# Patient Record
Sex: FEMALE | Race: WHITE | NOT HISPANIC OR LATINO | Employment: FULL TIME
[De-identification: names, ages, dates, MRNs, and addresses within clinical notes are randomized per-mention and may not be internally consistent; named-entity substitution may affect disease eponyms.]

---

## 2017-07-15 ENCOUNTER — HEALTH MAINTENANCE LETTER (OUTPATIENT)
Age: 37
End: 2017-07-15

## 2018-03-22 ENCOUNTER — APPOINTMENT (OUTPATIENT)
Dept: ULTRASOUND IMAGING | Facility: CLINIC | Age: 38
End: 2018-03-22
Attending: OBSTETRICS & GYNECOLOGY
Payer: COMMERCIAL

## 2018-03-22 ENCOUNTER — HOSPITAL ENCOUNTER (OUTPATIENT)
Facility: CLINIC | Age: 38
Discharge: HOME | End: 2018-03-23
Attending: OBSTETRICS & GYNECOLOGY | Admitting: OBSTETRICS & GYNECOLOGY
Payer: COMMERCIAL

## 2018-03-22 VITALS — BODY MASS INDEX: 33.92 KG/M2 | WEIGHT: 229 LBS | HEIGHT: 69 IN | TEMPERATURE: 98.5 F

## 2018-03-22 PROBLEM — Z36.89 ENCOUNTER FOR TRIAGE IN PREGNANT PATIENT: Status: ACTIVE | Noted: 2018-03-22

## 2018-03-22 PROCEDURE — 76815 OB US LIMITED FETUS(S): CPT

## 2018-03-22 NOTE — IP AVS SNAPSHOT
[facility] Labor and Delivery    [address]    [facility] [address]    Phone:  [phone]    Fax:  [phone]                                       After Visit Summary   3/22/2018    Eliza Park    MRN: [id number]           After Visit Summary Signature Page     I have received my discharge instructions, and my questions have been answered. I have discussed any challenges I see with this plan with the nurse or doctor.    ..........................................................................................................................................  Patient/Patient Representative Signature      ..........................................................................................................................................  Patient Representative Print Name and Relationship to Patient    ..................................................               ................................................  Date                                            Time    ..........................................................................................................................................  Reviewed by Signature/Title    ...................................................              ..............................................  Date                                                            Time

## 2018-03-22 NOTE — IP AVS SNAPSHOT
MRN:[id number]                      After Visit Summary   3/22/2018    Eliza Park    MRN: [id number]           Thank you!     Thank you for choosing [facility] for your care. Our goal is always to provide you with excellent care. Hearing back from our patients is one way we can continue to improve our services. Please take a few minutes to complete the written survey that you may receive in the mail after you visit. If you would like to speak to someone directly about your visit please contact Patient Relations at [phone]. Thank you!          Patient Information     Date Of Birth          1980        About your hospital stay     You were admitted on:  March 22, 2018 You last received care in the:  [facility] Delivery    You were discharged on:  March 23, 2018       Who to Call     For medical emergencies, please call 911.  For non-urgent questions about your medical care, please call your primary care provider or clinic, None          Attending Provider     Provider Specialty    Ese Tovar MD OB/Gyn       Primary Care Provider    None Specified      Further instructions from your care team       Discharge Instruction for Undelivered Patients      You were seen for: Bleeding Assessment  We Consulted: Dr. Tovar  You had (Test or Medicine):U/S, Cervical dilation exam, uterine and fetal monitoring     Diet:   Drink 8 to 12 glasses of liquids (milk, juice, water) every day.  You may eat meals and snacks.     Activity:  Rest the pelvic area. No sex. Do not stimulate breasts or nipples.  Count fetal kicks everyday (see handout)  Call your doctor or nurse midwife if your baby is moving less than usual.     Call your provider if you notice:  Swelling in your face or increased swelling in your hands or legs.  Headaches that are not relieved by Tylenol (acetaminophen).  Changes in your vision (blurring: seeing spots or stars.)  Nausea (sick to your  "stomach) and vomiting (throwing up).   Weight gain of 5 pounds or more per week.  Heartburn that doesn't go away.  Signs of bladder infection: pain when you urinate (use the toilet), need to go more often and more urgently.  The bag of negron (rupture of membranes) breaks, or you notice leaking in your underwear.  Bright red blood in your underwear.  Abdominal (lower belly) or stomach pain.  For first baby: Contractions (tightening) less than 5 minutes apart for one hour or more.  Second (plus) baby: Contractions (tightening) less than 10 minutes apart and getting stronger.  *If less than 34 weeks: Contractions (tightenings) more than 6 times in one hour.  Increase or change in vaginal discharge (note the color and amount)      Follow-up:  As scheduled in the clinic          Pending Results     Date and Time Order Name Status Description    3/22/2018 2202 US OB Limited One Or More Fetuses Preliminary             Admission Information     Date & Time Provider Department Dept. Phone    3/22/2018 Ese Tovar MD [facility] Labor and Delivery [phone]      Your Vitals Were     Temperature Height Weight BMI (Body Mass Index)          98.5  F (36.9  C) 1.753 m (5' 9\") 103.9 kg (229 lb) 33.82 kg/m2        MyChart Information     [employer] lets you send messages to your doctor, view your test results, renew your prescriptions, schedule appointments and more. To sign up, go to [email or web address] . Click on \"Log in\" on the left side of the screen, which will take you to the Welcome page. Then click on \"Sign up Now\" on the right side of the page.     You will be asked to enter the access code listed below, as well as some personal information. Please follow the directions to create your username and password.     Your access code is: [email or web address]  Expires: 2018 12:44 AM     Your access code will  in 90 days. If you need help or a new code, please call your [place] clinic or [phone].        Care " EveryWhere ID     This is your Care EveryWhere ID. This could be used by other organizations to access your [employer] medical records  [id number]        Equal Access to Services     JENIFER STALEY : Eduardo Benitez, ruth guzmán, donta kahallie edwards, khurram rockwell radharaegan fulton laLuistiesha rodney. So [facility] [phone].    ATENCIÓN: Si habla español, tiene a tang disposición servicios gratuitos de asistencia lingüística. Llame al [phone].    We comply with applicable federal civil rights laws and [place] laws. We do not discriminate on the basis of race, color, national origin, age, disability, sex, sexual orientation, or gender identity.               Review of your medicines      UNREVIEWED medicines. Ask your doctor about these medicines        Dose / Directions    PNV PO        Dose:  1 tablet   Take 1 tablet by mouth   Refills:  0       VITAMIN D (CHOLECALCIFEROL) PO        Dose:  1000 Units   Take 1,000 Units by mouth daily   Refills:  0                Protect others around you: Learn how to safely use, store and throw away your medicines at www.disposemymeds.org.             Medication List: This is a list of all your medications and when to take them. Check marks below indicate your daily home schedule. Keep this list as a reference.      Medications           Morning Afternoon Evening Bedtime As Needed    PNV PO   Take 1 tablet by mouth                                VITAMIN D (CHOLECALCIFEROL) PO   Take 1,000 Units by mouth daily                                          More Information        Kick Counts  It s normal to worry about your baby s health. One way you can know your baby s doing well is to record the baby s movements once a day. This is called a kick count. You will usually feel your baby move by the 20th week of pregnancy. Remember to take your kick count records to all your appointments with your healthcare provider.       How to Count Kicks    Choose a time when the baby  is active, such as after a meal.     Sit comfortably or lie on your side.     The first time the baby moves, write down the time.     Count each movement until the baby has moved 10 times. This can take from 20 minutes to 2 hours.     If the baby hasn t moved 4 times in 1 hour, pat your stomach to wake the baby up.    Write down the time you feel the baby s 10th movement.    Try to do it at the same time each day.  When to Call Your Healthcare Provider  Call your healthcare provider right away if you notice any of the following:    Your baby moves fewer than 10 times in 2 hours while you re doing kick counts.    Your baby moves much less often than on the days before.    You have not felt your baby move all day.    [id number] [employer]. [address] [place] [address], [place] [address]. All rights reserved. This information is not intended as a substitute for professional medical care. Always follow your healthcare professional's instructions.  This information has been modified by your health care provider with permission from the publisher.

## 2018-03-23 LAB
ALBUMIN UR-MCNC: NEGATIVE MG/DL
APPEARANCE UR: CLEAR
BILIRUB UR QL STRIP: NEGATIVE
COLOR UR AUTO: NORMAL
FIBRONECTIN FETAL VAG QL: NEGATIVE
GLUCOSE UR STRIP-MCNC: NEGATIVE MG/DL
HGB UR QL STRIP: NEGATIVE
KETONES UR STRIP-MCNC: NEGATIVE MG/DL
LEUKOCYTE ESTERASE UR QL STRIP: NEGATIVE
NITRATE UR QL: NEGATIVE
PH UR STRIP: 7 PH (ref 5–7)
RBC #/AREA URNS AUTO: 0 /HPF (ref 0–2)
SOURCE: NORMAL
SP GR UR STRIP: 1 (ref 1–1.03)
UROBILINOGEN UR STRIP-MCNC: 0 MG/DL (ref 0–2)
WBC #/AREA URNS AUTO: 1 /HPF (ref 0–5)

## 2018-03-23 PROCEDURE — G0463 HOSPITAL OUTPT CLINIC VISIT: HCPCS

## 2018-03-23 PROCEDURE — 82731 ASSAY OF FETAL FIBRONECTIN: CPT | Performed by: OBSTETRICS & GYNECOLOGY

## 2018-03-23 PROCEDURE — 81001 URINALYSIS AUTO W/SCOPE: CPT | Performed by: OBSTETRICS & GYNECOLOGY

## 2018-03-23 NOTE — PLAN OF CARE
Data: Patient presented to [facility]: 3/22/2018  8:30 PM.  Reason for maternal/fetal assessment is vaginal bleeding. Patient reports lower abdominal cramping for the past few days and had bright red bleeding when she wiped after using the restroom today.  Patient is a .  No prenatal record found as this patient does not go to a clinic affiliated with [facility]. Attempting to locate a prenatal.  Pregnancy  has been complicated by has been uncomplicated.  Gestational Age 26w2d. VSS. Fetal movement present. Patient denies leaking of vaginal fluid/rupture of membranes, abdominal pain, pelvic pressure, nausea, vomiting, headache, visual disturbances, epigastric or URQ pain, significant edema. Support person is not present.   Action: Verbal consent for EFM. Triage assessment completed. Bill of rights reviewed.  Response: Patient verbalized agreement with plan. Will contact Dr Ese Tovar with update and further orders.

## 2018-03-23 NOTE — DISCHARGE INSTRUCTIONS
Discharge Instruction for Undelivered Patients      You were seen for: Bleeding Assessment  We Consulted: Dr. Tovar  You had (Test or Medicine):U/S, Cervical dilation exam, uterine and fetal monitoring     Diet:   Drink 8 to 12 glasses of liquids (milk, juice, water) every day.  You may eat meals and snacks.     Activity:  Rest the pelvic area. No sex. Do not stimulate breasts or nipples.  Count fetal kicks everyday (see handout)  Call your doctor or nurse midwife if your baby is moving less than usual.     Call your provider if you notice:  Swelling in your face or increased swelling in your hands or legs.  Headaches that are not relieved by Tylenol (acetaminophen).  Changes in your vision (blurring: seeing spots or stars.)  Nausea (sick to your stomach) and vomiting (throwing up).   Weight gain of 5 pounds or more per week.  Heartburn that doesn't go away.  Signs of bladder infection: pain when you urinate (use the toilet), need to go more often and more urgently.  The bag of negron (rupture of membranes) breaks, or you notice leaking in your underwear.  Bright red blood in your underwear.  Abdominal (lower belly) or stomach pain.  For first baby: Contractions (tightening) less than 5 minutes apart for one hour or more.  Second (plus) baby: Contractions (tightening) less than 10 minutes apart and getting stronger.  *If less than 34 weeks: Contractions (tightenings) more than 6 times in one hour.  Increase or change in vaginal discharge (note the color and amount)      Follow-up:  As scheduled in the clinic

## 2018-03-23 NOTE — PROVIDER NOTIFICATION
03/22/18 2128   Provider Notification   Provider Name/Title Dr Tovar   Method of Notification Phone   Request Evaluate - Remote   Notification Reason Patient Arrived;Status Update     Dr Tovar updated: arrival, c/o of moderately painful, infrequent cherelle-vora contractions and vaginal bleeding with small clots when she wiped today at work. No active bleeding and no uterine activity noted now. MD states to order an OB limited ultrasound to determine placental location if we cannot obtain prenatal records. If no previa, check cervix and call back with results.

## 2018-03-23 NOTE — PROGRESS NOTES
Data: Patient assessed in the [facility] for vaginal bleeding.  Cervical exam posterior and closed.  Membranes intact.  Contractions not present.  Action:  Presumed adequate fetal oxygenation documented (see flow record). Discharge instructions reviewed.  Patient instructed to report change in fetal movement, vaginal leaking of fluid or bleeding, abdominal pain, or any concerns related to the pregnancy to her nurse/physician.    Response: Orders to discharge home per Ese Tovar.  Patient verbalized understanding of education and verbalized agreement with plan. Discharged to home at 0155  .

## 2018-03-23 NOTE — PROVIDER NOTIFICATION
03/23/18 0024   Provider Notification   Provider Name/Title Dr. Tovar   Method of Notification Phone   Request Evaluate - Remote   Notification Reason Other (Comment)   Updated MD of u/s results, pt report of bright red bleeding when she voided at 2300 which she noted when she wiped and some dripping into toilet (sample was not saved for RN to visualize), external visualization negative for any bleeding, abdomen soft and nontender to palpation. Orders received to collect FFN, check SVE. If cervix closed no need to send FFN. If closed/no bleeding ok to discharge pt to home.

## 2018-03-23 NOTE — PROVIDER NOTIFICATION
03/22/18 7570   OB Patient Position   Maternal Position Left lateral     Pt uncomfortable on triage bed. Repositioned to left lateral, unable to trace FHR with Eliza in lateral position.

## 2018-03-23 NOTE — PROGRESS NOTES
Cervix closed, thick, and posterior. No blood noted during exam. FFN not sent. Pt up to void and blood noted on toilet paper. UA sent.

## 2018-04-01 NOTE — PROGRESS NOTES
Results discussed directly with patient in clinic. Further details documented in the note.     Ese Tovar MD

## 2019-03-11 ENCOUNTER — HOSPITAL ENCOUNTER (EMERGENCY)
Facility: CLINIC | Age: 39
Discharge: HOME | End: 2019-03-11
Attending: PHYSICIAN ASSISTANT | Admitting: PHYSICIAN ASSISTANT
Payer: COMMERCIAL

## 2019-03-11 ENCOUNTER — APPOINTMENT (OUTPATIENT)
Dept: ULTRASOUND IMAGING | Facility: CLINIC | Age: 39
End: 2019-03-11
Attending: PHYSICIAN ASSISTANT
Payer: COMMERCIAL

## 2019-03-11 VITALS
BODY MASS INDEX: 31.96 KG/M2 | OXYGEN SATURATION: 99 % | DIASTOLIC BLOOD PRESSURE: 67 MMHG | HEART RATE: 91 BPM | WEIGHT: 218 LBS | SYSTOLIC BLOOD PRESSURE: 119 MMHG | RESPIRATION RATE: 18 BRPM | TEMPERATURE: 99.5 F

## 2019-03-11 DIAGNOSIS — N61.0 MASTITIS, RIGHT, ACUTE: ICD-10-CM

## 2019-03-11 PROCEDURE — 76642 ULTRASOUND BREAST LIMITED: CPT | Mod: RT

## 2019-03-11 PROCEDURE — 25000132 ZZH RX MED GY IP 250 OP 250 PS 637: Performed by: EMERGENCY MEDICINE

## 2019-03-11 PROCEDURE — 25000132 ZZH RX MED GY IP 250 OP 250 PS 637: Performed by: PHYSICIAN ASSISTANT

## 2019-03-11 PROCEDURE — 99284 EMERGENCY DEPT VISIT MOD MDM: CPT | Mod: 25

## 2019-03-11 RX ORDER — DICLOXACILLIN SODIUM 500 MG
500 CAPSULE ORAL 4 TIMES DAILY
Qty: 40 CAPSULE | Refills: 0 | Status: SHIPPED | OUTPATIENT
Start: 2019-03-11 | End: 2019-03-21

## 2019-03-11 RX ORDER — DICLOXACILLIN SODIUM 500 MG
500 CAPSULE ORAL ONCE
Status: COMPLETED | OUTPATIENT
Start: 2019-03-11 | End: 2019-03-11

## 2019-03-11 RX ORDER — IBUPROFEN 600 MG/1
600 TABLET, FILM COATED ORAL ONCE
Status: COMPLETED | OUTPATIENT
Start: 2019-03-11 | End: 2019-03-11

## 2019-03-11 RX ORDER — ACETAMINOPHEN 500 MG
1000 TABLET ORAL ONCE
Status: COMPLETED | OUTPATIENT
Start: 2019-03-11 | End: 2019-03-11

## 2019-03-11 RX ADMIN — ACETAMINOPHEN 1000 MG: 500 TABLET, FILM COATED ORAL at 19:20

## 2019-03-11 RX ADMIN — DICLOXACILLIN SODIUM 500 MG: 500 CAPSULE ORAL at 17:16

## 2019-03-11 RX ADMIN — IBUPROFEN 600 MG: 600 TABLET ORAL at 14:38

## 2019-03-11 ASSESSMENT — ENCOUNTER SYMPTOMS
MYALGIAS: 1
COLOR CHANGE: 1
FEVER: 1

## 2019-03-11 NOTE — ED PROVIDER NOTES
History     Chief Complaint:  Breast Pain     HPI   Eliza Park is a 38 year old female who is currently breastfeeding who presents for evaluation of breast pain. Yesterday, the patient started to develop redness, swelling, and pain to her right breast with a fever that has been as high as 103 at home and generalized myalgias. She has been taking Tylenol and Ibuprofen with limited improvement of her symptoms. She has not had any discharge from her breast. Due to this new pain and swelling of her right breast, the patient came into the ED with primary concern that she could have mastitis. Her OBGYN is Dr. Rubina Irby of [facility].     Allergies:  NKDA      Medications:    clonazePAM (KLONOPIN) 0.5 MG tablet  escitalopram (LEXAPRO) 10 MG tablet    Past Medical History:    Migraine headaches     Past Surgical History:    Endovenous RF, 1st vein     Family History:    Cerebrovascular disease - Father   Skin cancer - Mother     Social History:  Tobacco use:    Former smoker - 0.50 packs / day for 8 years  Alcohol use:    Negative    Marital status:    Single   Accompanied to ED by:  Alone      Review of Systems   Constitutional: Positive for fever.   Musculoskeletal: Positive for myalgias.        (+) Right breast pain    Skin: Positive for color change (redness, right breast).   All other systems reviewed and are negative.      Physical Exam   Vitals:  Patient Vitals for the past 24 hrs:   BP Temp Temp src Pulse Heart Rate Resp SpO2 Weight   03/11/19 1934 119/67 -- -- 91 91 18 99 % --   03/11/19 1609 -- 99.5  F (37.5  C) Oral -- -- -- -- --   03/11/19 1432 124/73 99.9  F (37.7  C) Temporal 97 -- 16 97 % 98.9 kg (218 lb)       Physical Exam  General: Alert and interactive. Appears well. Cooperative and pleasant.   Eyes: The pupils are equal and round. EOMs intact. No scleral icterus.  ENT: No abnormalities to the external nose or ears. Mucous membranes moist. Posterior oropharynx is non-erythematous.       Neck: Trachea is in the midline. No nuchal rigidity.     CV: Regular rate and rhythm. S1 and S2 normal without murmur, click, gallop or rub.   Breast: Right breast has significant erythema, extending along the entire breast and superior toward the right axilla. This is most tender to the lateral aspect of the nipple. Palpable swelling and induration but no obvious abscess. No drainage from the nipple. Left breast is WNL without any drainage or erythema.   Resp: Breath sounds are clear bilaterally, without rhonchi, wheezes, rales. Non-labored, no retractions or accessory muscle use.     GI: Abdomen is soft without distension. No tenderness to palpation. No peritoneal signs.    MS: Moving all extremities well. Good muscle tone.   Skin: Warm and dry. No rash or lesions noted.  Neuro: Alert and oriented x 3. No focal neurologic deficits. Good strength and sensation in upper and lower extremities.    Psych: Awake. Alert.  Normal affect. Appropriate interactions.  Lymph: No anterior or posterior cervical lymphadenopathy noted.    Emergency Department Course     Imaging:  Radiographic findings were communicated with the patient who voiced understanding of the findings.    US Breast, Right:  Report called to RADHA Denise.   Per Radiology, US consistent with mastitis without abscess.     Interventions:  1438 Ibuprofen 600 mg PO  1716 Dicloxacillin PO   1920 Tylenol 1,000 mg PO     Emergency Department Course:  Nursing notes and vitals reviewed.  1624: I performed an exam of the patient as documented above.     1644: I updated and reassessed the patient.     1924: I updated and reassessed the patient.     Findings and plan explained to the Patient and significant other. Patient discharged home with instructions regarding supportive care, medications, and reasons to return. The importance of close follow-up was reviewed. The patient was prescribed Dicloxacillin.      Impression & Plan      Medical Decision Making:  Eliza THOMPSON  Bishop Park is a 38 year old female who presents for evaluation of right breast tenderness and erythema in the setting of breast feeding. The breast is warm to touch compared to other side and there are associated myalgias. A broad differential considered, including mastitis, viral syndrome, breast abscess, malignancy, blocked milk duct, and others.There is no signs at this point of severe sepsis or septic shock.     No abdominal pain, urinary symptoms, cough, or signs of other infection. US shows no signs of abscess. First dose of Dicloxacillin given here. Suggested warm compresses, continued breast feeding, Tylenol/Ibuprofen for pain, and antibiotics with probiotics. Close follow-up of gynecology or primary depending on patient preference. Ideally would like breast re-evaluation in the next 24-48 hours. Return indicated for persist fevers, drainage from breast, rapidly spreading redness, general unwellness.     Diagnosis:    ICD-10-CM   1. Mastitis, right, acute N61.0       Disposition:  Discharged to home with Dicloxacillin.     Discharge Medications:  dicloxacillin 500 MG capsule  Commonly known as:  DYNAPEN  500 mg, Oral, 4 TIMES DAILY            Guevara HOGAN, am serving as a scribe at 4:24 PM on 3/11/2019 to document services personally performed by Adriane Dowd PA-C, based on my observations and the provider's statements to me.    [facility] EMERGENCY DEPARTMENT       Adriane Dowd PA-C  03/12/19 0017

## 2019-03-11 NOTE — ED TRIAGE NOTES
Pt has red, painful swollen right breast.  Pt is breast feeding.  Feeling chills and body aches also.

## 2019-03-11 NOTE — ED AVS SNAPSHOT
[facility] Emergency Department  [address]  [facility] [address]  Phone:  [phone]  Fax:  [phone]                                    Eliza Park   MRN: [id number]    Department:  [facility] Emergency Department   Date of Visit:  3/11/2019           After Visit Summary Signature Page    I have received my discharge instructions, and my questions have been answered. I have discussed any challenges I see with this plan with the nurse or doctor.    ..........................................................................................................................................  Patient/Patient Representative Signature      ..........................................................................................................................................  Patient Representative Print Name and Relationship to Patient    ..................................................               ................................................  Date                                   Time    ..........................................................................................................................................  Reviewed by Signature/Title    ...................................................              ..............................................  Date                                               Time          22EPIC Rev 08/18

## 2019-03-12 NOTE — DISCHARGE INSTRUCTIONS
Continue warm compresses, hot showers, breast feeding.   Take Ibuprofen/Tylenol for pain.   Continue Dicloxacillin four times daily for the infection. Take with probiotic.   You need to call primary care and return for wound recheck in 24-48 hours.   Return here for high fevers/spreading redness, feeling worse.

## 2019-10-06 ENCOUNTER — APPOINTMENT (OUTPATIENT)
Dept: GENERAL RADIOLOGY | Facility: CLINIC | Age: 39
End: 2019-10-06
Attending: EMERGENCY MEDICINE
Payer: COMMERCIAL

## 2019-10-06 ENCOUNTER — HOSPITAL ENCOUNTER (EMERGENCY)
Facility: CLINIC | Age: 39
Discharge: HOME | End: 2019-10-06
Attending: EMERGENCY MEDICINE | Admitting: EMERGENCY MEDICINE
Payer: COMMERCIAL

## 2019-10-06 VITALS
SYSTOLIC BLOOD PRESSURE: 134 MMHG | RESPIRATION RATE: 16 BRPM | OXYGEN SATURATION: 96 % | TEMPERATURE: 98 F | DIASTOLIC BLOOD PRESSURE: 78 MMHG | HEART RATE: 89 BPM

## 2019-10-06 DIAGNOSIS — S93.401A SPRAIN OF RIGHT ANKLE, UNSPECIFIED LIGAMENT, INITIAL ENCOUNTER: ICD-10-CM

## 2019-10-06 PROCEDURE — 99283 EMERGENCY DEPT VISIT LOW MDM: CPT

## 2019-10-06 PROCEDURE — 73610 X-RAY EXAM OF ANKLE: CPT | Mod: RT

## 2019-10-06 PROCEDURE — 25000132 ZZH RX MED GY IP 250 OP 250 PS 637: Performed by: EMERGENCY MEDICINE

## 2019-10-06 RX ORDER — IBUPROFEN 600 MG/1
600 TABLET, FILM COATED ORAL ONCE
Status: COMPLETED | OUTPATIENT
Start: 2019-10-06 | End: 2019-10-06

## 2019-10-06 RX ADMIN — IBUPROFEN 600 MG: 600 TABLET, FILM COATED ORAL at 01:03

## 2019-10-06 ASSESSMENT — ENCOUNTER SYMPTOMS: NUMBNESS: 0

## 2019-10-06 NOTE — ED PROVIDER NOTES
History     Chief Complaint:  Right Ankle Injury     The history is provided by the patient.      Eliza Park is a 39 year old female who presents with right ankle injury. Four days ago (10/2/2019) at 2200, patient was at the gym exercising on a stationary stair-stepper. She had her left foot stationary and was moving the right foot. She used the foot to step up when she heard a cracking sound and had an onset of pain. Patient then fell off the machine. Patient wrapped the foot and placed it in a boot but states her pain has worsened since the injury despite icing it and walking with her boot. She then presents to the ED for evaluation. Here, patient states her knees hurt as well but this may be due to her overcompensating for her ankle while walking.     Allergies:  NKDA     Medications:    Mirena     Past Medical History:    Tinea versicolor  Migraine  Varicose veins  Herpes simplex virus infection  Cellulitis  High vaginal laceration  Drug abuse   Cord entanglement, complication labor and delivery  GERD    Past Surgical History:    Venous closure    Family History:    Cerebrovascular disease - father   Skin cancer  Stroke - father     Social History:  Former smoker.   Positive for alcohol use.   Negative for drug use.  Marital Status:  Single.     Review of Systems   Musculoskeletal:        Positive for right ankle pain.     Neurological: Negative for numbness.       Physical Exam     Patient Vitals for the past 24 hrs:   BP Temp Pulse Resp SpO2   10/06/19 0050 134/78 98  F (36.7  C) 89 18 100 %     Physical Exam  Constitutional: Alert, attentive, GCS 15  CV: 2+ DP and PT pulses, brisk distal cap refill  MSK: lateral ankle swelling and tenderness, no medial malleolus tenderness, no deformity   No tenderness to the midfoot, calcaneus, base of 5th metatarsal or proximal tib/fib  Neurological: 5/5 strength to the DF, PF, EHL and FHL motor functions; sensation intact to the DP, SP, T, S and S  distributions  Skin: Skin is warm and dry.      Emergency Department Course   Imaging:  Radiographic findings were communicated with the patient who voiced understanding of the findings.    Ankle XR, G/E 3 views, right   Preliminary Result   IMPRESSION:    1. No convincing acute fracture, malalignment or other acute osseous abnormality of the right ankle.   2. A 0.4 cm ossific opacity inferior to the medial malleolus most likely relates to an old avulsion injury.   3. Mild soft tissue swelling over the lateral malleolus.      As per radiology.      Interventions:  0103 Ibuprofen tablet 600 mg PO    Emergency Department Course:  0040 Nursing notes and vitals reviewed. I performed an exam of the patient as documented above.     Medicine administered as documented above.    The patient was sent for a right ankle XR while in the emergency department, findings above.     0125 I rechecked the patient and discussed the results of her workup thus far.     Findings and plan explained to the Patient. Patient discharged home with instructions regarding supportive care, medications, and reasons to return. The importance of close follow-up was reviewed.     I personally reviewed and answered all related questions prior to discharge.     Impression & Plan    Medical Decision Making:  This is a very pleasant 39 year old female who presented with a right ankle injury from 4 days ago consistent with sprain, with no evidence of fracture on x-ray. There is no proximal tenderness or pain to suggest tib/fib or knee injury. There is no midfoot, calcaneus or base of 5th MT tenderness to suggest midfoot or calcaneus injury.  The patient preferred to continue with her ortho boot vs crutches and/or air splint. I advised ibuprofen, ice, rest and elevation. The patient is ambulatory with the boot. I advised strict return precautions for worse pain, swelling, numbness, or any other concerning symptoms, as well as follow-up with primary care in  3-5 days.        Diagnosis:    ICD-10-CM    1. Sprain of right ankle, unspecified ligament, initial encounter S93.401A      Disposition:  discharged to home    Discharge Medications:  New Prescriptions    No medications on file     Scribe Disposition  I, Janet Fenton, am serving as a scribe on 10/6/2019 at 12:59 AM to personally document services performed by Chin Costa MD based on my observations and the provider's statements to me.     Janet Fenton  10/6/2019   [facility] EMERGENCY DEPARTMENT       Chin Costa MD  10/06/19 0142

## 2019-10-06 NOTE — ED AVS SNAPSHOT
[facility] Emergency Department  [address]  [facility] [address]  Phone:  [phone]  Fax:  [phone]                                    Eliza Park   MRN: [id number]    Department:  [facility] Emergency Department   Date of Visit:  10/6/2019           After Visit Summary Signature Page    I have received my discharge instructions, and my questions have been answered. I have discussed any challenges I see with this plan with the nurse or doctor.    ..........................................................................................................................................  Patient/Patient Representative Signature      ..........................................................................................................................................  Patient Representative Print Name and Relationship to Patient    ..................................................               ................................................  Date                                   Time    ..........................................................................................................................................  Reviewed by Signature/Title    ...................................................              ..............................................  Date                                               Time          22EPIC Rev 08/18

## 2019-10-06 NOTE — ED TRIAGE NOTES
Reports felt a cracking sound on R ankle after doing step ups. Reports injuring ankle on Wednesday

## 2019-10-06 NOTE — LETTER
October 6, 2019      To Whom It May Concern:      Eliza DONNA Park was seen in our Emergency Department today, 10/06/19.  Please excuse her from work today.    Sincerely,        Chin Costa MD

## 2019-11-03 ENCOUNTER — HEALTH MAINTENANCE LETTER (OUTPATIENT)
Age: 39
End: 2019-11-03

## 2020-02-09 ENCOUNTER — APPOINTMENT (OUTPATIENT)
Dept: GENERAL RADIOLOGY | Facility: CLINIC | Age: 40
End: 2020-02-09
Attending: PHYSICIAN ASSISTANT
Payer: COMMERCIAL

## 2020-02-09 ENCOUNTER — HOSPITAL ENCOUNTER (EMERGENCY)
Facility: CLINIC | Age: 40
Discharge: HOME | End: 2020-02-09
Attending: PHYSICIAN ASSISTANT | Admitting: PHYSICIAN ASSISTANT
Payer: COMMERCIAL

## 2020-02-09 ENCOUNTER — APPOINTMENT (OUTPATIENT)
Dept: ULTRASOUND IMAGING | Facility: CLINIC | Age: 40
End: 2020-02-09
Attending: PHYSICIAN ASSISTANT
Payer: COMMERCIAL

## 2020-02-09 VITALS
RESPIRATION RATE: 18 BRPM | TEMPERATURE: 98.2 F | DIASTOLIC BLOOD PRESSURE: 88 MMHG | SYSTOLIC BLOOD PRESSURE: 162 MMHG | OXYGEN SATURATION: 99 %

## 2020-02-09 DIAGNOSIS — S99.911A ANKLE INJURY, RIGHT, INITIAL ENCOUNTER: ICD-10-CM

## 2020-02-09 DIAGNOSIS — M25.471 RIGHT ANKLE SWELLING: ICD-10-CM

## 2020-02-09 PROCEDURE — 25000132 ZZH RX MED GY IP 250 OP 250 PS 637: Performed by: PHYSICIAN ASSISTANT

## 2020-02-09 PROCEDURE — 73610 X-RAY EXAM OF ANKLE: CPT | Mod: RT

## 2020-02-09 PROCEDURE — 99283 EMERGENCY DEPT VISIT LOW MDM: CPT

## 2020-02-09 PROCEDURE — 93971 EXTREMITY STUDY: CPT | Mod: RT

## 2020-02-09 RX ORDER — IBUPROFEN 600 MG/1
600 TABLET, FILM COATED ORAL ONCE
Status: COMPLETED | OUTPATIENT
Start: 2020-02-09 | End: 2020-02-09

## 2020-02-09 RX ORDER — ACETAMINOPHEN 325 MG/1
975 TABLET ORAL ONCE
Status: COMPLETED | OUTPATIENT
Start: 2020-02-09 | End: 2020-02-09

## 2020-02-09 RX ORDER — OXYCODONE HYDROCHLORIDE 5 MG/1
5 TABLET ORAL EVERY 6 HOURS PRN
Qty: 10 TABLET | Refills: 0 | Status: SHIPPED | OUTPATIENT
Start: 2020-02-09

## 2020-02-09 RX ADMIN — IBUPROFEN 600 MG: 600 TABLET, FILM COATED ORAL at 15:31

## 2020-02-09 RX ADMIN — ACETAMINOPHEN 975 MG: 325 TABLET, FILM COATED ORAL at 15:31

## 2020-02-09 ASSESSMENT — ENCOUNTER SYMPTOMS: ARTHRALGIAS: 1

## 2020-02-09 NOTE — ED TRIAGE NOTES
Pt presents with R ankle pain starting yesterday. States was driving, and heard a pop, then has had pain since and not able to ambulate without walking boot pt had previously. States hx of sprained ankles but this does not feel the same.

## 2020-02-09 NOTE — ED PROVIDER NOTES
History     Chief Complaint:  Ankle Pain    HPI   Eliza Park is a 39 year old female with a history of  recurrent ankle sprain who presents with ankle pain. The patient reported that she was sitting in the drive through at [employer] when she suddenly felt a sudden pop and pain in her right ankle that prevented her from driving or bearing weight. She states that the pain is in her lateral ankle and exacerbated by flexion and making a [place]' with her foot. She denied any new or more strenuous activities in the last several days. The patient also reported that since her last ankle sprain she has been experiencing intermittent shooting pain across the back of her right ankle that are so severe that she can't bear weight on her ankle. She expressed some concern that her pain may be due to a blood clot but she denied any oral birth control use or recent immobilizations. Of note, she has an appointment with a vein clinic tomorrow.       Allergies:  The patient has no known drug allergies.     Medications:    Klonopin  Lexapro    Past Medical History:    Varicose veins of lower extremities with complications  Migraines    Past Surgical History:    HC endovenous RF, 1st vein    Family History:    Cerebrovascular disease   Skin cancer     Social History:  Former smoker   Alcohol use endorsed   No current use, former marijuana and methamphetamine use  Marital Status:   [2]     Review of Systems   Musculoskeletal: Positive for arthralgias.   All other systems reviewed and are negative.    Physical Exam     Patient Vitals for the past 24 hrs:   BP Temp Temp src Heart Rate Resp SpO2   02/09/20 1513 (!) 162/88 98.2  F (36.8  C) Oral 71 18 99 %     Physical Exam  General: Well appearing, well nourished. Normal mood and affect.  Skin: Good turgor, no rash, no unusual bruising or prominent lesions.  HEENT: Head: Normocephalic, atraumatic, no visible masses.   Eyes: Conjunctiva clear.  Cardiac: Tachycardic, regular  rhythm, no murmur or gallop.   Lungs: Clear to auscultation.  Musculoskeletal: Normal gait and station.   Right foot: Tenderness palpation throughout the lateral malleolus and anterior talofibular ligament.  No pain edema throughout this region.  Dorsiflexion plantarflexion intact, but limited due to discomfort.  Palpation of the bones throughout the foot are non-tender. Dorsalis pedis and posterior tibial arteries intact. Normal capillary refill to all toes. The talus, navicular, tarsals, and metatarsals are without obvious fracture. Toes are grossly normal. Normal sensation to the foot. Flexors and extensors to the toes are normal.  Full flexion-extension of the right knee without difficulty.  No tenderness or defect with palpation throughout the Achilles tendon. Negative Woodward's test.  No calf tenderness.  Neurologic: Oriented x 3. GCS: 15.  Psychiatric: Intact recent and remote memory, judgment and insight, normal mood and affect.     Emergency Department Course   Imaging:  Radiographic findings were communicated with the patient who voiced understanding of the findings.    XR Ankle 3 views, Right:   1.  Moderate soft tissue swelling about the ankle, lateral side  greater than medial. Similar appearance to 10/6/2019.  2.  No acute fracture or joint malalignment.  3.  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     US Lower Extremity Venous Duplex, right:  No evidence for DVT within the right lower extremity, as per radiology.     Interventions:  1531 Tylenol 975 mg PO    Ibuprofen 600 mg PO     Emergency Department Course:  Nursing notes and vitals reviewed. (1517) I performed an exam of the patient as documented above.     IV inserted. Medicine administered as documented above. Blood drawn. This was sent to the lab for further testing, results above.    The patient was sent for ankle XR and lower  extremity US while in the emergency department, findings above.     (1726) I rechecked the patient and discussed the results of her workup thus far.     Findings and plan explained to the Patient. Patient discharged home with instructions regarding supportive care, medications, and reasons to return. The importance of close follow-up was reviewed. The patient was prescribed Oxycodone.     I personally reviewed the laboratory results with the Patient and answered all related questions prior to discharge.     Impression & Plan    Medical Decision Making:  Eliza Park is a 39 year old female who presents for evaluation of right ankle pain. Details of the patient's history can be noted in the HPI. Differential diagnosis included sprain, strain, fracture, dislocation, contusion, amongst others. Due to concern for fracture, xray obtained. This returned showing no bony abnormalities. Additionally, on exam, no signs of septic arthritis, gout, pseudogout, fracture, cellulitis, etc.The patients neurovascular status is normal. Remaining exam negative for other injury.  Patient did have concern for blood clot as she has had them in the past.  Due to the spontaneous swelling, we did also get an ultrasound, which returned without abnormalities.  The patient's discomfort and swelling is over the anterior talofibular ligament.  I suspect that as she has had numerous sprains in the past, that this is been further irritated.  However, she has not had a clear injury.  I do think we should be conservative, put her in a boot, have her call [facility] orthopedics tomorrow and schedule follow-up.  We discussed gentle range of motion of the ankle, Tylenol, ibuprofen, ice, rest, elevation at home.  Oxycodone for pain control.  She was given crutches, weightbearing as tolerated.  All questions were answered prior to the patient's discharge. She was in agreement with the plan stated above.     Diagnosis:    ICD-10-CM    1. Ankle  injury, right, initial encounter S99.911A    2. Right ankle swelling M25.471        Disposition:  discharged to home    Discharge Medications:  New Prescriptions    OXYCODONE (ROXICODONE) 5 MG TABLET    Take 1 tablet (5 mg) by mouth every 6 hours as needed for pain     Scribe Disclosure:  I, Kristyrodo Leong, am serving as a scribe on 2/9/2020 at 3:17 PM to personally document services performed by Elsy Whitfield PA based on my observations and the provider's statements to me.     Kristy Leong  2/9/2020   [facility] EMERGENCY DEPARTMENT    This was created at least in part with a voice recognition software. Mistakes/typos may be present.        Elsy Whitfield PA  02/09/20 1906

## 2020-02-09 NOTE — ED AVS SNAPSHOT
[facility] Emergency Department  [address]  [facility] [address]  Phone:  [phone]  Fax:  [phone]                                    Eliza Park   MRN: [id number]    Department:  [facility] Emergency Department   Date of Visit:  2/9/2020           After Visit Summary Signature Page    I have received my discharge instructions, and my questions have been answered. I have discussed any challenges I see with this plan with the nurse or doctor.    ..........................................................................................................................................  Patient/Patient Representative Signature      ..........................................................................................................................................  Patient Representative Print Name and Relationship to Patient    ..................................................               ................................................  Date                                   Time    ..........................................................................................................................................  Reviewed by Signature/Title    ...................................................              ..............................................  Date                                               Time          22EPIC Rev 08/18

## 2020-02-09 NOTE — DISCHARGE INSTRUCTIONS
Walking boot, and use crutches, only have weightbearing as tolerated.  Make sure you are elevating at home as much as possible.  Ice, Tylenol and ibuprofen.  If pain not controlled, use oxycodone.  Call [facility] orthopedics and schedule follow-up for the next 2 days.  Return for any changing worsening symptoms, new concerns.  Try and do gentle range of motion exercises with ankle.

## 2020-11-16 ENCOUNTER — HEALTH MAINTENANCE LETTER (OUTPATIENT)
Age: 40
End: 2020-11-16

## 2021-09-18 ENCOUNTER — HEALTH MAINTENANCE LETTER (OUTPATIENT)
Age: 41
End: 2021-09-18

## 2022-01-08 ENCOUNTER — HEALTH MAINTENANCE LETTER (OUTPATIENT)
Age: 42
End: 2022-01-08

## 2022-11-19 ENCOUNTER — HEALTH MAINTENANCE LETTER (OUTPATIENT)
Age: 42
End: 2022-11-19

## 2023-04-15 ENCOUNTER — HEALTH MAINTENANCE LETTER (OUTPATIENT)
Age: 43
End: 2023-04-15

## 2024-03-04 ENCOUNTER — HOSPITAL ENCOUNTER (EMERGENCY)
Facility: CLINIC | Age: 44
Discharge: HOME | End: 2024-03-04
Attending: EMERGENCY MEDICINE | Admitting: EMERGENCY MEDICINE
Payer: COMMERCIAL

## 2024-03-04 ENCOUNTER — APPOINTMENT (OUTPATIENT)
Dept: CT IMAGING | Facility: CLINIC | Age: 44
End: 2024-03-04
Attending: EMERGENCY MEDICINE
Payer: COMMERCIAL

## 2024-03-04 VITALS
SYSTOLIC BLOOD PRESSURE: 124 MMHG | DIASTOLIC BLOOD PRESSURE: 70 MMHG | OXYGEN SATURATION: 97 % | WEIGHT: 231.92 LBS | HEART RATE: 59 BPM | BODY MASS INDEX: 35.15 KG/M2 | HEIGHT: 68 IN | TEMPERATURE: 98.8 F | RESPIRATION RATE: 18 BRPM

## 2024-03-04 DIAGNOSIS — G44.209 ACUTE NON INTRACTABLE TENSION-TYPE HEADACHE: ICD-10-CM

## 2024-03-04 LAB
ALBUMIN SERPL BCG-MCNC: 4.4 G/DL (ref 3.5–5.2)
ALP SERPL-CCNC: 70 U/L (ref 40–150)
ALT SERPL W P-5'-P-CCNC: 32 U/L (ref 0–50)
ANION GAP SERPL CALCULATED.3IONS-SCNC: 11 MMOL/L (ref 7–15)
AST SERPL W P-5'-P-CCNC: 25 U/L (ref 0–45)
BASOPHILS # BLD AUTO: 0 10E3/UL (ref 0–0.2)
BASOPHILS NFR BLD AUTO: 1 %
BILIRUB SERPL-MCNC: 0.5 MG/DL
BUN SERPL-MCNC: 13.4 MG/DL (ref 6–20)
CALCIUM SERPL-MCNC: 9.4 MG/DL (ref 8.6–10)
CHLORIDE SERPL-SCNC: 102 MMOL/L (ref 98–107)
CREAT SERPL-MCNC: 0.65 MG/DL (ref 0.51–0.95)
DEPRECATED HCO3 PLAS-SCNC: 25 MMOL/L (ref 22–29)
EGFRCR SERPLBLD CKD-EPI 2021: >90 ML/MIN/1.73M2
EOSINOPHIL # BLD AUTO: 0.1 10E3/UL (ref 0–0.7)
EOSINOPHIL NFR BLD AUTO: 2 %
ERYTHROCYTE [DISTWIDTH] IN BLOOD BY AUTOMATED COUNT: 12.2 % (ref 10–15)
GLUCOSE SERPL-MCNC: 94 MG/DL (ref 70–99)
HCT VFR BLD AUTO: 42.3 % (ref 35–47)
HGB BLD-MCNC: 14.9 G/DL (ref 11.7–15.7)
IMM GRANULOCYTES # BLD: 0 10E3/UL
IMM GRANULOCYTES NFR BLD: 0 %
LYMPHOCYTES # BLD AUTO: 1.5 10E3/UL (ref 0.8–5.3)
LYMPHOCYTES NFR BLD AUTO: 23 %
MCH RBC QN AUTO: 31 PG (ref 26.5–33)
MCHC RBC AUTO-ENTMCNC: 35.2 G/DL (ref 31.5–36.5)
MCV RBC AUTO: 88 FL (ref 78–100)
MONOCYTES # BLD AUTO: 0.6 10E3/UL (ref 0–1.3)
MONOCYTES NFR BLD AUTO: 8 %
NEUTROPHILS # BLD AUTO: 4.4 10E3/UL (ref 1.6–8.3)
NEUTROPHILS NFR BLD AUTO: 66 %
NRBC # BLD AUTO: 0 10E3/UL
NRBC BLD AUTO-RTO: 0 /100
PLATELET # BLD AUTO: 282 10E3/UL (ref 150–450)
POTASSIUM SERPL-SCNC: 4.6 MMOL/L (ref 3.4–5.3)
PROT SERPL-MCNC: 7.5 G/DL (ref 6.4–8.3)
RBC # BLD AUTO: 4.8 10E6/UL (ref 3.8–5.2)
SODIUM SERPL-SCNC: 138 MMOL/L (ref 135–145)
WBC # BLD AUTO: 6.6 10E3/UL (ref 4–11)

## 2024-03-04 PROCEDURE — 99285 EMERGENCY DEPT VISIT HI MDM: CPT | Mod: 25

## 2024-03-04 PROCEDURE — 85004 AUTOMATED DIFF WBC COUNT: CPT | Performed by: EMERGENCY MEDICINE

## 2024-03-04 PROCEDURE — 96361 HYDRATE IV INFUSION ADD-ON: CPT

## 2024-03-04 PROCEDURE — 96365 THER/PROPH/DIAG IV INF INIT: CPT

## 2024-03-04 PROCEDURE — 36415 COLL VENOUS BLD VENIPUNCTURE: CPT | Performed by: EMERGENCY MEDICINE

## 2024-03-04 PROCEDURE — 80053 COMPREHEN METABOLIC PANEL: CPT | Performed by: EMERGENCY MEDICINE

## 2024-03-04 PROCEDURE — 250N000013 HC RX MED GY IP 250 OP 250 PS 637: Performed by: EMERGENCY MEDICINE

## 2024-03-04 PROCEDURE — 258N000003 HC RX IP 258 OP 636: Performed by: EMERGENCY MEDICINE

## 2024-03-04 PROCEDURE — 96375 TX/PRO/DX INJ NEW DRUG ADDON: CPT

## 2024-03-04 PROCEDURE — 250N000009 HC RX 250: Performed by: EMERGENCY MEDICINE

## 2024-03-04 PROCEDURE — 250N000011 HC RX IP 250 OP 636: Mod: JZ | Performed by: EMERGENCY MEDICINE

## 2024-03-04 PROCEDURE — 70450 CT HEAD/BRAIN W/O DYE: CPT

## 2024-03-04 RX ORDER — DEXAMETHASONE SODIUM PHOSPHATE 10 MG/ML
10 INJECTION, SOLUTION INTRAMUSCULAR; INTRAVENOUS ONCE
Status: COMPLETED | OUTPATIENT
Start: 2024-03-04 | End: 2024-03-04

## 2024-03-04 RX ORDER — KETOROLAC TROMETHAMINE 15 MG/ML
15 INJECTION, SOLUTION INTRAMUSCULAR; INTRAVENOUS ONCE
Status: COMPLETED | OUTPATIENT
Start: 2024-03-04 | End: 2024-03-04

## 2024-03-04 RX ORDER — METOCLOPRAMIDE HYDROCHLORIDE 5 MG/ML
10 INJECTION INTRAMUSCULAR; INTRAVENOUS ONCE
Status: COMPLETED | OUTPATIENT
Start: 2024-03-04 | End: 2024-03-04

## 2024-03-04 RX ORDER — DIPHENHYDRAMINE HYDROCHLORIDE 50 MG/ML
25 INJECTION INTRAMUSCULAR; INTRAVENOUS ONCE
Status: COMPLETED | OUTPATIENT
Start: 2024-03-04 | End: 2024-03-04

## 2024-03-04 RX ORDER — RIZATRIPTAN BENZOATE 10 MG/1
10 TABLET ORAL
Qty: 15 TABLET | Refills: 0 | Status: SHIPPED | OUTPATIENT
Start: 2024-03-04

## 2024-03-04 RX ORDER — CYCLOBENZAPRINE HCL 10 MG
10 TABLET ORAL ONCE
Status: COMPLETED | OUTPATIENT
Start: 2024-03-04 | End: 2024-03-04

## 2024-03-04 RX ADMIN — SODIUM CHLORIDE 1000 ML: 9 INJECTION, SOLUTION INTRAVENOUS at 11:18

## 2024-03-04 RX ADMIN — METOCLOPRAMIDE HYDROCHLORIDE 10 MG: 5 INJECTION INTRAMUSCULAR; INTRAVENOUS at 11:17

## 2024-03-04 RX ADMIN — CYCLOBENZAPRINE 10 MG: 10 TABLET, FILM COATED ORAL at 13:22

## 2024-03-04 RX ADMIN — DEXAMETHASONE SODIUM PHOSPHATE 10 MG: 10 INJECTION, SOLUTION INTRAMUSCULAR; INTRAVENOUS at 11:17

## 2024-03-04 RX ADMIN — KETOROLAC TROMETHAMINE 15 MG: 15 INJECTION, SOLUTION INTRAMUSCULAR; INTRAVENOUS at 11:18

## 2024-03-04 RX ADMIN — DIPHENHYDRAMINE HYDROCHLORIDE 25 MG: 50 INJECTION INTRAMUSCULAR; INTRAVENOUS at 11:17

## 2024-03-04 RX ADMIN — VALPROATE SODIUM 1000 MG: 100 INJECTION, SOLUTION INTRAVENOUS at 13:22

## 2024-03-04 ASSESSMENT — COLUMBIA-SUICIDE SEVERITY RATING SCALE - C-SSRS
1. IN THE PAST MONTH, HAVE YOU WISHED YOU WERE DEAD OR WISHED YOU COULD GO TO SLEEP AND NOT WAKE UP?: NO
6. HAVE YOU EVER DONE ANYTHING, STARTED TO DO ANYTHING, OR PREPARED TO DO ANYTHING TO END YOUR LIFE?: NO
2. HAVE YOU ACTUALLY HAD ANY THOUGHTS OF KILLING YOURSELF IN THE PAST MONTH?: NO

## 2024-03-04 ASSESSMENT — ACTIVITIES OF DAILY LIVING (ADL)
ADLS_ACUITY_SCORE: 35
ADLS_ACUITY_SCORE: 37

## 2024-03-04 NOTE — ED TRIAGE NOTES
Pt has been having almost constant migraines over the past few months.  She states that today the pain is worse than it has been.  She was started on imitrex and has been taking this.  She called the nurse line and was told she needed a CT scan.     Triage Assessment (Adult)       Row Name 03/04/24 0928          Triage Assessment    Airway WDL WDL        Respiratory WDL    Respiratory WDL WDL        Skin Circulation/Temperature WDL    Skin Circulation/Temperature WDL WDL        Cardiac WDL    Cardiac WDL WDL        Peripheral/Neurovascular WDL    Peripheral Neurovascular WDL WDL        Cognitive/Neuro/Behavioral WDL    Cognitive/Neuro/Behavioral WDL WDL

## 2024-03-06 NOTE — ED PROVIDER NOTES
History     Chief Complaint:  Headache       HPI   Eliza Park is a 43 year old female who presents with chief complaint headache/migraine.  Patient reports at least 3 years of daily headache/migraine.  She reports she takes Tylenol and ibuprofen on a daily basis.  She also has tried Imitrex more recently without relief.  She denies any fever/chills.  Denies vomiting.  She reports pain is localized to the back of her head, her temples, her forehead, and behind her eyes.  She reports a long history of headaches/migraine and reports she was on Topamax once in the past but did not like the way the medication made her feel.  She states she talked to someone today who recommended that she get a head CT which is her reason for presentation.      Independent Historian:   None - Patient Only    Review of External Notes:   Reviewed PCP note from 2/20/2024.      Medications:    clonazePAM (KLONOPIN) 0.5 MG tablet  escitalopram (LEXAPRO) 10 MG tablet  oxyCODONE (ROXICODONE) 5 MG tablet  Prenatal Vit w/Io-Fsrrrqjvt-BW (PNV PO)  VITAMIN D, CHOLECALCIFEROL, PO        Past Medical History:    Past Medical History:   Diagnosis Date    Cellulitis and abscess of leg, except foot 2/4/99    High vaginal laceration, with delivery     Migraine headaches 9/23/2010    Other and unspecified cord entanglement, without mention of compression, complicating labor and delivery, delivered 11/8/07    Other, mixed, or unspecified nondependent drug abuse, unspecified     RW OTHER (ABSTRACTED) 2/16/00       Past Surgical History:    Past Surgical History:   Procedure Laterality Date    C FULL ROUT OBSTE CARE,VAGINAL DELIV  11/6/07    Baby Boy    HC ENDOVENOUS RF, 1ST VEIN  6/3/10        Physical Exam       Physical Exam  Nursing note and vitals reviewed.  HENT:   Mouth/Throat: Moist mucous membranes.   Eyes: EOMI, nonicteric sclera  Cardiovascular: Normal rate, regular rhythm, no murmurs, rubs, or gallops  Pulmonary/Chest: Effort normal  and breath sounds normal. No respiratory distress. No wheezes. No rales.   Abdominal: Soft. Nontender, nondistended, no guarding or rigidity.   Musculoskeletal: Normal range of motion.   Neurological: Alert. Moves all extremities spontaneously.     CN's II-XII intact. 5/5 BUE and BLE strength. PERRL    EOMI without nystagmus.      Sensation intact to light touch. Negative pronator drift.    Finger to nose intact.   Skin: Skin is warm and dry. No rash noted.         Emergency Department Course       Imaging:  Head CT w/o contrast   Final Result   IMPRESSION: No acute intracranial pathology.       DANIEL SMITH MD            SYSTEM ID:  [id number]             Laboratory:  Labs Ordered and Resulted from Time of ED Arrival to Time of ED Departure   COMPREHENSIVE METABOLIC PANEL - Normal       Result Value    Sodium 138      Potassium 4.6      Carbon Dioxide (CO2) 25      Anion Gap 11      Urea Nitrogen 13.4      Creatinine 0.65      GFR Estimate >90      Calcium 9.4      Chloride 102      Glucose 94      Alkaline Phosphatase 70      AST 25      ALT 32      Protein Total 7.5      Albumin 4.4      Bilirubin Total 0.5     CBC WITH PLATELETS AND DIFFERENTIAL    WBC Count 6.6      RBC Count 4.80      Hemoglobin 14.9      Hematocrit 42.3      MCV 88      MCH 31.0      MCHC 35.2      RDW 12.2      Platelet Count 282      % Neutrophils 66      % Lymphocytes 23      % Monocytes 8      % Eosinophils 2      % Basophils 1      % Immature Granulocytes 0      NRBCs per 100 WBC 0      Absolute Neutrophils 4.4      Absolute Lymphocytes 1.5      Absolute Monocytes 0.6      Absolute Eosinophils 0.1      Absolute Basophils 0.0      Absolute Immature Granulocytes 0.0      Absolute NRBCs 0.0              Emergency Department Course & Assessments:    Interventions:  Medications   ketorolac (TORADOL) injection 15 mg (15 mg Intravenous $Given 3/4/24 1112)   metoclopramide (REGLAN) injection 10 mg (10 mg Intravenous $Given 3/4/24 1117)    diphenhydrAMINE (BENADRYL) injection 25 mg (25 mg Intravenous $Given 3/4/24 1117)   dexAMETHasone PF (DECADRON) injection 10 mg (10 mg Intravenous $Given 3/4/24 1117)   sodium chloride 0.9% BOLUS 1,000 mL (0 mLs Intravenous Stopped 3/4/24 1247)   valproate (DEPACON) 1,000 mg in sodium chloride 0.9 % 50 mL intermittent infusion (0 mg Intravenous Stopped 3/4/24 1415)   cyclobenzaprine (FLEXERIL) tablet 10 mg (10 mg Oral $Given 3/4/24 1322)          Independent Interpretation (X-rays, CTs, rhythm strip):  I independently reviewed the head CT. I see no evidence of intracranial hemorrhage/mass.       Consultations/Discussion of Management or Tests:     The patient arrived in triage where vitals were measured and recorded.   The patient was then escorted back to the emergency department.   The patient's medical records were reviewed.  Nursing notes and vitals were reviewed.    I performed an exam of the patient as documented above. The patient is in agreement with my plan of care.       Social Determinants of Health affecting care:   None    Disposition:  The patient was discharged.     Impression & Plan        Medical Decision Making:  Eliza Park is a 43 year old female who presents with a headache. She has long history of headaches.  I considered a broad differential diagnosis for this patient including tension, migraine, analgesic rebound, occipital neuralgia, etc.  I also considered other less common but serious causes considered included meningitis, encephalitis, subarachnoid bleed, temporal arteritis, stroke, tumor, etc.  She has no signs of serious headache etiologies at this point.  Given long history of headache/migraine without imaging, elected to obtain a head CT which is fortunately negative for emergent pathology.  I have suspicion for combined etiologies including tension and rebound.  She did feel symptomatically improved after ED treatment.  I discussed with her trying to limit medication to  only 15 days/month.  I also referred her to neurology for further evaluation and treatment.  She reports Maxalt worked for her better than Imitrex in the past. She has history of tubal ligation and denies any possibility of pregnancy including declining testing. She is in stable condition at the time of discharge, red flags that should merit ED return were discussed as well as recommended follow-up instructions. All questions were answered and she is in agreement with the plan.          Diagnosis:    ICD-10-CM    1. Acute non intractable tension-type headache  G44.209 Neurology Referral (Migraine Care Package)           Discharge Medications:  Discharge Medication List as of 3/4/2024  4:37 PM        START taking these medications    Details   rizatriptan (MAXALT) 10 MG tablet Take 1 tablet (10 mg) by mouth at onset of headache for migraine May repeat in 2 hours. Max 3 tablets/24 hours., Disp-15 tablet, R-0, E-Prescribe                     Francisco Ruiz MD  03/06/24 3356

## 2024-04-07 ENCOUNTER — HEALTH MAINTENANCE LETTER (OUTPATIENT)
Age: 44
End: 2024-04-07

## 2024-06-16 ENCOUNTER — HEALTH MAINTENANCE LETTER (OUTPATIENT)
Age: 44
End: 2024-06-16

## 2025-03-23 ENCOUNTER — HOSPITAL ENCOUNTER (EMERGENCY)
Facility: CLINIC | Age: 45
Discharge: HOME | End: 2025-03-23
Attending: EMERGENCY MEDICINE | Admitting: EMERGENCY MEDICINE

## 2025-03-23 ENCOUNTER — APPOINTMENT (OUTPATIENT)
Dept: CT IMAGING | Facility: CLINIC | Age: 45
End: 2025-03-23
Attending: EMERGENCY MEDICINE

## 2025-03-23 VITALS
RESPIRATION RATE: 20 BRPM | HEIGHT: 68 IN | TEMPERATURE: 100.5 F | HEART RATE: 79 BPM | BODY MASS INDEX: 34.86 KG/M2 | DIASTOLIC BLOOD PRESSURE: 88 MMHG | SYSTOLIC BLOOD PRESSURE: 141 MMHG | OXYGEN SATURATION: 97 % | WEIGHT: 230 LBS

## 2025-03-23 DIAGNOSIS — J02.0 STREPTOCOCCAL PHARYNGITIS: ICD-10-CM

## 2025-03-23 DIAGNOSIS — J03.90 TONSILLITIS: ICD-10-CM

## 2025-03-23 LAB
ANION GAP SERPL CALCULATED.3IONS-SCNC: 15 MMOL/L (ref 7–15)
BASOPHILS # BLD AUTO: 0.1 10E3/UL (ref 0–0.2)
BASOPHILS NFR BLD AUTO: 0 %
BUN SERPL-MCNC: 10.2 MG/DL (ref 6–20)
CALCIUM SERPL-MCNC: 9.4 MG/DL (ref 8.8–10.4)
CHLORIDE SERPL-SCNC: 102 MMOL/L (ref 98–107)
CREAT SERPL-MCNC: 0.63 MG/DL (ref 0.51–0.95)
EGFRCR SERPLBLD CKD-EPI 2021: >90 ML/MIN/1.73M2
EOSINOPHIL # BLD AUTO: 0 10E3/UL (ref 0–0.7)
EOSINOPHIL NFR BLD AUTO: 0 %
ERYTHROCYTE [DISTWIDTH] IN BLOOD BY AUTOMATED COUNT: 12.5 % (ref 10–15)
GLUCOSE SERPL-MCNC: 100 MG/DL (ref 70–99)
HCG SERPL QL: NEGATIVE
HCO3 SERPL-SCNC: 20 MMOL/L (ref 22–29)
HCT VFR BLD AUTO: 40.6 % (ref 35–47)
HGB BLD-MCNC: 13.9 G/DL (ref 11.7–15.7)
HOLD SPECIMEN: NORMAL
IMM GRANULOCYTES # BLD: 0.1 10E3/UL
IMM GRANULOCYTES NFR BLD: 1 %
LYMPHOCYTES # BLD AUTO: 1.3 10E3/UL (ref 0.8–5.3)
LYMPHOCYTES NFR BLD AUTO: 6 %
MCH RBC QN AUTO: 30 PG (ref 26.5–33)
MCHC RBC AUTO-ENTMCNC: 34.2 G/DL (ref 31.5–36.5)
MCV RBC AUTO: 88 FL (ref 78–100)
MONOCYTES # BLD AUTO: 1.7 10E3/UL (ref 0–1.3)
MONOCYTES NFR BLD AUTO: 9 %
MONOCYTES NFR BLD AUTO: NEGATIVE %
NEUTROPHILS # BLD AUTO: 16.4 10E3/UL (ref 1.6–8.3)
NEUTROPHILS NFR BLD AUTO: 84 %
NRBC # BLD AUTO: 0 10E3/UL
NRBC BLD AUTO-RTO: 0 /100
PLAT MORPH BLD: NORMAL
PLATELET # BLD AUTO: 262 10E3/UL (ref 150–450)
POTASSIUM SERPL-SCNC: 3.8 MMOL/L (ref 3.4–5.3)
RBC # BLD AUTO: 4.63 10E6/UL (ref 3.8–5.2)
RBC MORPH BLD: NORMAL
S PYO DNA THROAT QL NAA+PROBE: DETECTED
SODIUM SERPL-SCNC: 137 MMOL/L (ref 135–145)
WBC # BLD AUTO: 19.6 10E3/UL (ref 4–11)

## 2025-03-23 PROCEDURE — 80048 BASIC METABOLIC PNL TOTAL CA: CPT | Performed by: EMERGENCY MEDICINE

## 2025-03-23 PROCEDURE — 250N000011 HC RX IP 250 OP 636: Performed by: EMERGENCY MEDICINE

## 2025-03-23 PROCEDURE — 96361 HYDRATE IV INFUSION ADD-ON: CPT | Performed by: EMERGENCY MEDICINE

## 2025-03-23 PROCEDURE — 87651 STREP A DNA AMP PROBE: CPT | Performed by: EMERGENCY MEDICINE

## 2025-03-23 PROCEDURE — 70491 CT SOFT TISSUE NECK W/DYE: CPT

## 2025-03-23 PROCEDURE — 96375 TX/PRO/DX INJ NEW DRUG ADDON: CPT | Performed by: EMERGENCY MEDICINE

## 2025-03-23 PROCEDURE — 250N000009 HC RX 250: Performed by: EMERGENCY MEDICINE

## 2025-03-23 PROCEDURE — 84703 CHORIONIC GONADOTROPIN ASSAY: CPT | Performed by: EMERGENCY MEDICINE

## 2025-03-23 PROCEDURE — 96365 THER/PROPH/DIAG IV INF INIT: CPT | Mod: 59 | Performed by: EMERGENCY MEDICINE

## 2025-03-23 PROCEDURE — 36415 COLL VENOUS BLD VENIPUNCTURE: CPT | Performed by: EMERGENCY MEDICINE

## 2025-03-23 PROCEDURE — 85025 COMPLETE CBC W/AUTO DIFF WBC: CPT | Performed by: EMERGENCY MEDICINE

## 2025-03-23 PROCEDURE — 250N000013 HC RX MED GY IP 250 OP 250 PS 637: Performed by: EMERGENCY MEDICINE

## 2025-03-23 PROCEDURE — 258N000003 HC RX IP 258 OP 636: Performed by: EMERGENCY MEDICINE

## 2025-03-23 PROCEDURE — 99285 EMERGENCY DEPT VISIT HI MDM: CPT | Mod: 25 | Performed by: EMERGENCY MEDICINE

## 2025-03-23 PROCEDURE — 86308 HETEROPHILE ANTIBODY SCREEN: CPT | Performed by: EMERGENCY MEDICINE

## 2025-03-23 RX ORDER — AMPICILLIN AND SULBACTAM 2; 1 G/1; G/1
3 INJECTION, POWDER, FOR SOLUTION INTRAMUSCULAR; INTRAVENOUS ONCE
Status: COMPLETED | OUTPATIENT
Start: 2025-03-23 | End: 2025-03-23

## 2025-03-23 RX ORDER — KETOROLAC TROMETHAMINE 15 MG/ML
15 INJECTION, SOLUTION INTRAMUSCULAR; INTRAVENOUS ONCE
Status: COMPLETED | OUTPATIENT
Start: 2025-03-23 | End: 2025-03-23

## 2025-03-23 RX ORDER — OXYCODONE HCL 5 MG/5 ML
2.5 SOLUTION, ORAL ORAL EVERY 6 HOURS PRN
Qty: 30 ML | Refills: 0 | Status: SHIPPED | OUTPATIENT
Start: 2025-03-23 | End: 2025-03-26

## 2025-03-23 RX ORDER — IOPAMIDOL 755 MG/ML
500 INJECTION, SOLUTION INTRAVASCULAR ONCE
Status: COMPLETED | OUTPATIENT
Start: 2025-03-23 | End: 2025-03-23

## 2025-03-23 RX ORDER — MORPHINE SULFATE 4 MG/ML
4 INJECTION, SOLUTION INTRAMUSCULAR; INTRAVENOUS
Status: DISCONTINUED | OUTPATIENT
Start: 2025-03-23 | End: 2025-03-23 | Stop reason: HOSPADM

## 2025-03-23 RX ORDER — ACETAMINOPHEN 325 MG/1
975 TABLET ORAL ONCE
Status: DISCONTINUED | OUTPATIENT
Start: 2025-03-23 | End: 2025-03-23

## 2025-03-23 RX ORDER — DEXAMETHASONE SODIUM PHOSPHATE 10 MG/ML
10 INJECTION, SOLUTION INTRAMUSCULAR; INTRAVENOUS ONCE
Status: COMPLETED | OUTPATIENT
Start: 2025-03-23 | End: 2025-03-23

## 2025-03-23 RX ORDER — ACETAMINOPHEN 325 MG/10.15ML
650 LIQUID ORAL ONCE
Status: COMPLETED | OUTPATIENT
Start: 2025-03-23 | End: 2025-03-23

## 2025-03-23 RX ADMIN — SODIUM CHLORIDE 65 ML: 9 INJECTION, SOLUTION INTRAVENOUS at 17:07

## 2025-03-23 RX ADMIN — ACETAMINOPHEN 650 MG: 325 SUSPENSION ORAL at 17:42

## 2025-03-23 RX ADMIN — DEXAMETHASONE SODIUM PHOSPHATE 10 MG: 10 INJECTION, SOLUTION INTRAMUSCULAR; INTRAVENOUS at 16:02

## 2025-03-23 RX ADMIN — KETOROLAC TROMETHAMINE 15 MG: 15 INJECTION, SOLUTION INTRAMUSCULAR; INTRAVENOUS at 16:02

## 2025-03-23 RX ADMIN — AMPICILLIN SODIUM AND SULBACTAM SODIUM 3 G: 2; 1 INJECTION, POWDER, FOR SOLUTION INTRAMUSCULAR; INTRAVENOUS at 18:54

## 2025-03-23 RX ADMIN — SODIUM CHLORIDE 1000 ML: 0.9 INJECTION, SOLUTION INTRAVENOUS at 17:42

## 2025-03-23 RX ADMIN — MORPHINE SULFATE 4 MG: 4 INJECTION, SOLUTION INTRAMUSCULAR; INTRAVENOUS at 16:02

## 2025-03-23 RX ADMIN — SODIUM CHLORIDE 1000 ML: 0.9 INJECTION, SOLUTION INTRAVENOUS at 15:46

## 2025-03-23 RX ADMIN — IOPAMIDOL 90 ML: 755 INJECTION, SOLUTION INTRAVENOUS at 17:06

## 2025-03-23 ASSESSMENT — COLUMBIA-SUICIDE SEVERITY RATING SCALE - C-SSRS
1. IN THE PAST MONTH, HAVE YOU WISHED YOU WERE DEAD OR WISHED YOU COULD GO TO SLEEP AND NOT WAKE UP?: NO
2. HAVE YOU ACTUALLY HAD ANY THOUGHTS OF KILLING YOURSELF IN THE PAST MONTH?: NO
6. HAVE YOU EVER DONE ANYTHING, STARTED TO DO ANYTHING, OR PREPARED TO DO ANYTHING TO END YOUR LIFE?: NO

## 2025-03-23 ASSESSMENT — ACTIVITIES OF DAILY LIVING (ADL)
ADLS_ACUITY_SCORE: 41

## 2025-03-23 NOTE — ED PROVIDER NOTES
"  Emergency Department Note      History of Present Illness     Chief Complaint   Pharyngitis      HPI   Eliza Park is a 44 year old female who presents with sore throat, subjective fever, vomiting, myalgias and leg cramps for approximately 4 days. Patient also notes bilateral ear pain and difficulty keeping water down. She reports taking Tylenol with minimal relief. Last dose was 2 extra strength tablets today at 0930. Patient denies taking Advil, taking other routine medications, baseline medical problems or diarrhea. Mother reports she did receive her MMR vaccine when she was a child. Patient works as a hairdresser. Denies sick kids at home or any contacts with Mono exposure.    Independent Historian   Mother as detailed above.    Review of External Notes   none    Past Medical History     Medical History and Problem List   Cellulitis and abscess of leg  High vaginal laceration, with delivery  Migraine  GERD    Medications   Klonopin  Lexapro  Maxalt    Surgical History   Bilateral venous closure    Physical Exam     Patient Vitals for the past 24 hrs:   BP Temp Temp src Pulse Resp SpO2 Height Weight   03/23/25 1900 (!) 141/88 -- -- 79 -- 97 % -- --   03/23/25 1858 138/90 -- -- 78 -- 96 % -- --   03/23/25 1609 139/87 -- -- 90 -- 95 % -- --   03/23/25 1435 (!) 141/82 100.5  F (38.1  C) Temporal 113 20 99 % 1.721 m (5' 7.75\") 104.3 kg (230 lb)     Physical Exam  Constitutional:       Appearance: She is well-developed. She is ill-appearing and diaphoretic.   HENT:      Right Ear: External ear normal.      Left Ear: External ear normal.      Ears:      Comments: Middle ear effusion bilaterally     Mouth/Throat:      Mouth: Mucous membranes are dry.      Pharynx: Oropharynx is clear. Posterior oropharyngeal erythema present. No oropharyngeal exudate.      Comments: Bilaterally swollen and erythematous tonsils but uvula is midline.  Small exudates seen on bilateral tonsils.  Diffuse erythema posterior " oropharynx.  No oral ulcers or lesions.  Eyes:      General: No scleral icterus.     Conjunctiva/sclera: Conjunctivae normal.      Pupils: Pupils are equal, round, and reactive to light.   Cardiovascular:      Rate and Rhythm: Regular rhythm. Tachycardia present.      Heart sounds: Normal heart sounds. No murmur heard.     No friction rub. No gallop.   Pulmonary:      Effort: Pulmonary effort is normal. No respiratory distress.      Breath sounds: Normal breath sounds. No stridor. No wheezing, rhonchi or rales.   Abdominal:      General: Bowel sounds are normal. There is no distension.      Palpations: Abdomen is soft. There is no mass.      Tenderness: There is no abdominal tenderness. There is no right CVA tenderness or left CVA tenderness.   Musculoskeletal:         General: Normal range of motion.      Cervical back: Normal range of motion and neck supple.      Right lower leg: No edema.      Left lower leg: No edema.   Lymphadenopathy:      Cervical: Cervical adenopathy present.   Skin:     General: Skin is warm.      Capillary Refill: Capillary refill takes less than 2 seconds.      Findings: No rash.   Neurological:      General: No focal deficit present.      Mental Status: She is alert.           Diagnostics     Lab Results   Labs Ordered and Resulted from Time of ED Arrival to Time of ED Departure   BASIC METABOLIC PANEL - Abnormal       Result Value    Sodium 137      Potassium 3.8      Chloride 102      Carbon Dioxide (CO2) 20 (*)     Anion Gap 15      Urea Nitrogen 10.2      Creatinine 0.63      GFR Estimate >90      Calcium 9.4      Glucose 100 (*)    CBC WITH PLATELETS AND DIFFERENTIAL - Abnormal    WBC Count 19.6 (*)     RBC Count 4.63      Hemoglobin 13.9      Hematocrit 40.6      MCV 88      MCH 30.0      MCHC 34.2      RDW 12.5      Platelet Count 262      % Neutrophils 84      % Lymphocytes 6      % Monocytes 9      % Eosinophils 0      % Basophils 0      % Immature Granulocytes 1      NRBCs per  100 WBC 0      Absolute Neutrophils 16.4 (*)     Absolute Lymphocytes 1.3      Absolute Monocytes 1.7 (*)     Absolute Eosinophils 0.0      Absolute Basophils 0.1      Absolute Immature Granulocytes 0.1      Absolute NRBCs 0.0     GROUP A STREPTOCOCCUS PCR THROAT SWAB - Abnormal    Group A strep by PCR Detected (*)    MONONUCLEOSIS SCREEN - Normal    Mononucleosis Screen Negative     HCG QUALITATIVE PREGNANCY - Normal    hCG Serum Qualitative Negative     RBC AND PLATELET MORPHOLOGY    RBC Morphology Confirmed RBC Indices      Platelet Assessment        Value: Automated Count Confirmed. Platelet morphology is normal.     Imaging   Soft tissue neck CT w contrast   Final Result   IMPRESSION:    1.  Acute tonsillopharyngitis with marked phlegmonous change. No well-defined abscess or drainable fluid collection.   2.  Enlarged bilateral level II lymph nodes, likely reactive.        Independent Interpretation   None    ED Course      Medications Administered   Medications   morphine (PF) injection 4 mg (4 mg Intravenous $Given 3/23/25 1602)   ampicillin-sulbactam (UNASYN) 3 g vial to attach to  mL bag (3 g Intravenous $New Bag 3/23/25 1854)   ketorolac (TORADOL) injection 15 mg (15 mg Intravenous $Given 3/23/25 1602)   sodium chloride 0.9% BOLUS 1,000 mL (0 mLs Intravenous Stopped 3/23/25 1741)   dexAMETHasone PF (DECADRON) injection 10 mg (10 mg Intravenous $Given 3/23/25 1602)   sodium chloride 0.9% BOLUS 1,000 mL (0 mLs Intravenous Stopped 3/23/25 1849)   acetaminophen (TYLENOL) oral liquid 650 mg (650 mg Oral $Given 3/23/25 1742)   sodium chloride for CT scan flush use (65 mLs Intravenous $Given 3/23/25 1707)   iopamidol (ISOVUE-370) solution 500 mL (90 mLs Intravenous $Given 3/23/25 1706)     Procedures   Procedures     Discussion of Management   None    ED Course   ED Course as of 03/23/25 1905   Sun Mar 23, 2025   1502 I obtained history and examined the patient as noted above.     1650 I rechecked and  updated the patient regarding test results. Patient says her sore throat is improving.    1743 I rechecked and updated the patient. She declines admission. She has been able to eat and drink and would like to go home.    1902 I rechecked the patient and explained findings. Patient reports feeling better. We discussed plan for discharge and patient is in agreement with plan.        Additional Documentation  None    Medical Decision Making / Diagnosis     CMS Diagnoses: None    MIPS       None    MDM   Eliza Park is a 44 year old female who presents with above symptoms.  Initially she had trouble talking due to throat pain.  Her voice was somewhat muffled.  No drooling or trismus.  Labs show elevated white count and a positive strep.  CT of the soft tissue neck, performed for concern for abscess, just showed tonsillitis and pharyngitis.  She is feeling better after medications and fluids and pain control.  She was able to take medications orally here.  She was able to drink and take ice chips.  IV Unasyn given.  I discussed with her options of admission for pain control and hydration versus management at home.  Since she is taking only very small amounts here, I recommended admission.  She declined.  She states that she does not want to stay and feels better at home.  We discussed return precautions.  I asked her to return for any point she is not doing well at home.  Prescriptions of oxycodone as well as antibiotics called into the pharmacy.  She needs to make sure she is doing Tylenol and ibuprofen as well.  I gave her referral to ENT for follow-up in the next couple days.  She agrees to return if symptoms worsen or she changes her mind about mission.  Patient discharged with family member taking her home.    Disposition   The patient was discharged.     Diagnosis     ICD-10-CM    1. Streptococcal pharyngitis  J02.0       2. Tonsillitis  J03.90            Discharge Medications   New Prescriptions     AMOXICILLIN-CLAVULANATE (AUGMENTIN) 875-125 MG TABLET    Take 1 tablet by mouth 2 times daily for 10 days.    OXYCODONE (ROXICODONE) 5 MG/5ML SOLUTION    Take 2.5 mLs (2.5 mg) by mouth every 6 hours as needed for severe pain.       Scribe Disclosure:  I, Carmen Ambrose, am serving as a scribe at 3:09 PM on 3/23/2025 to document services personally performed by Lucrecia Denise MD based on my observations and the provider's statements to me.        Lucrecia Denise MD  03/23/25 2026

## 2025-03-23 NOTE — DISCHARGE INSTRUCTIONS
Push fluid  Tylenol 1000mg every 8 hours   Motrin 600mg every 6 hours  Push fluids  Start antibiotic tonight  Oxycodone for severe pain  ENT follow up next week if not better  Return if feeling worse

## 2025-03-23 NOTE — ED TRIAGE NOTES
Pt presents for evaluation of sore throat, difficulty speaking, muffled voice, fever and feeling unwell since Thursday. Tylenol around 0900.

## 2025-06-21 ENCOUNTER — HEALTH MAINTENANCE LETTER (OUTPATIENT)
Age: 45
End: 2025-06-21